# Patient Record
(demographics unavailable — no encounter records)

---

## 2024-10-11 NOTE — HISTORY OF PRESENT ILLNESS
[FreeTextEntry1] : 48 yo woman returns for f/u of obesity, pre-dm, PCOS  s/p LAGB x 2 but esophageal issues and had removed.  2-3 years ago had VSG.   max weight was 292 lbs  current weight = 202  lbs (6 lb weight loss since last visit) height = 5'3  recently started tamoxifen doing well with zepbound 15, phentermine 37.5 and topiramate 50 feels eating under control continues losing weight  she is otherwise without new complaints today

## 2024-10-11 NOTE — ASSESSMENT
[FreeTextEntry1] : BARIATRIC SURGERY HISTORY: LAGB x 2 max weight had been 265, lost 65 lbs. regained weight after second band deflated up to 292 lbs. VSG in 2019 with drop in weight to 237, regained to 270 - now down to 232 lbs  OBESITY COMORBIDITIES: pre-dm  ANTI-OBESITY MEDICATIONS: semaglutide  OBESITY MEDICATION SIDE EFFECTS: none   recommend the followin. cont zepbound 15mg 2. cont phentermine 37.5mg/topiramate 50 3. would benefit from increased physical activity longterm 4. bariatric vitamin recommended 5. discussed migrating towards WFPB diet due to hormonal based cancer 6. cont to work with NP on nutritional lifestyle changes  rtc in 6 weeks    rtc in 4-6 weeks.

## 2024-10-23 NOTE — PROCEDURE
[IUD Removal] : intrauterine device (IUD) removal [Time out performed] : Pre-procedure time out performed.  Patient's name, date of birth and procedure confirmed. [Consent Obtained] : Consent obtained [Risks] : risks [Benefits] : benefits [Alternatives] : alternatives [Patient] : patient [Speculum Placed] : speculum placed [Nonvisualization of Strings] : nonvisualization of strings [IUD Discarded] : IUD discarded [Tolerated Well] : Patient tolerated the procedure well [No Complications] : no complications [de-identified] : DCIS [de-identified] : IUD POSITION CONFIRMED W SONO;  IUD REMOVED W POLYP FORCEP

## 2025-02-05 NOTE — PLAN
[FreeTextEntry1] : ADVISED TO AVOID SEXUAL ACTIVITY UNTIL FUNDAL PLACEMENT CAN BE CONFIRMED BY RADIOLOGY.

## 2025-02-05 NOTE — PROCEDURE
[IUD Placement] : intrauterine device (IUD) placement [Prevention of Pregnancy] : prevention of pregnancy [Risks] : risks [Benefits] : benefits [Alternatives] : alternatives [Patient] : patient [Infection] : infection [Bleeding] : bleeding [Pain] : pain [Expulsion] : expulsion [Failure] : failure [Neg Pregnancy Test] : negative pregnancy test [History of Unprotected [place]] : no history of unprotected intercourse [No Premedication] : No premedication [Betadine] : Betadine [Tenaculum] : Tenaculum [Easy Passage] : Easy passage [Sounded to ___ cm] : sounded to [unfilled] ~Ucm [ParaGard IUD] : ParaGard IUD [Tolerated Well] : Patient tolerated the procedure well [No Complications] : No complications [None] : None [de-identified] : PARACERVICAL BLOCK PLACED W 10CC LIDOCAINE [de-identified] : TVS PERFORMED POST INSERTION;  IUD VISUALIZED IN ENDOMETRIAL CAVITY.  UNCLEAR IF IUD PRESENT TO FUNDUS OR OBSTRUCTED BY FIBROID [de-identified] : [id number] [de-identified] : 7/2029 [de-identified] : 12YRS [TextEntry] : MIRENA WAS REMOVED IN OCT DUE TO DCIS.  SINCE IUD REMOVAL MENSES HAVE BEEN HEAVY W BLEEDING FOR UP TO 10DAYS.  EXPLAINED THAT PARAGARD WILL MAKE BLEEDING WORSE.  DISCUSSED MGT OPTIONS OF ABLATION, [place], HYST.  PT WILL CONSIDER OPTIONS BUT DESIRES PARAGARD TODAY TO PROVIDE CONTRACEPTION WHILE SHE DECIDES ON LONG TERM MGT

## 2025-02-05 NOTE — REASON FOR VISIT
[Follow-Up Visit] : a follow-up visit for [Obesity] : obesity [Home] : at home, [unfilled] , at the time of the visit. [Other Location: e.g. Home (Enter Location, City,State)___] : at [unfilled] [Other:____] : [unfilled] [Verbal consent obtained from patient] : the patient, [unfilled]

## 2025-02-05 NOTE — HISTORY OF PRESENT ILLNESS
[FreeTextEntry1] : 50 yo woman returns for f/u of obesity, pre-dm, PCOS  s/p LAGB x 2 but esophageal issues and had removed.  2-3 years ago had VSG.   max weight was 292 lbs  current weight = 189  lbs (13 lb weight loss since last visit) height = 5'3  on tamoxifen doing well with zepbound 15, phentermine 37.5 and topiramate 50 feels eating under control continues losing weight maybe not enough water a little less exercise than before because of work stress  she is otherwise without new complaints today

## 2025-02-10 NOTE — PROCEDURE
[TextEntry] : HcG: negative  The patient was counseled on the risks, benefits and alternatives to the IUD.  The patient desires removal of the IUD due to malposition.    They desire Copper IUD for contraception.  Pre-operative time out performed.  Patients name, date of birth and procedure confirmed.  Speculum placed, IUD strings visualized. Paracervical block placed in standard fashion using 10 cc 1% lidocaine, IUD strings grasped with ring forceps  and removed without incident.  Excellent hemostasis noted. The patient tolerated the procedure well. EBL: minimal  Hcg: negative The patient denies unprotected intercourse in the last 7 days  The patient was counseled on the risks, benefits and alternatives to the Copper IUD.  The  patient understand the risks of infection, bleeding and trauma (specifically to the vagina, cervix, uterus, ovaries, fallopian tubes, bowel, bladder, ureters, rectum, pelvic nerves and blood vessels) and the small chance of IUD embedment, incorrect placement, incomplete removal and uterine perforation.  They were counseled on the small chance of uterine perforation, embedment and incorrect placement, incomplete removal, and that laparoscopy or hysteroscopy might be necessary to remove the IUD, and may or may not be successful in IUD removal. The patient was counseled on potential side effects including changes in bleeding, specifically irregular bleeding in the first 3-6 months and slightly heavier, crampier menses.  They were counseled on the increased risk of infection in the 3 weeks following insertion, the risk of pregnancy, and of ectopic pregnancy (and the need to see a physician immediately if she experiences signs/symptoms of pregnancy), and the risk of expulsion.  They understand the need to follow up for a string check.  They were informed that IUDs do not protect against sexually transmitted diseases and encouraged to use condoms.  All questions were answered.    Pelvic Exam: Uterus: anteverted  Pre-operative time out performed.  Patients name, date of birth and procedure confirmed.  Speculum placed, cervix cleansed x3 with betadyne.  An Anushka tenaculum was placed on the anterior lip of the cervix after infiltration with 10cc of 1% lidocaine.  A Copper IUD was brought onto the sterile field and inserted to the uterine fundus under direct ultrasound guidance per protocol in the usual fashion. Placement confirmed on ultrasound in the sagittal and transverse positions. Strings were trimmed to 2 cm.   Excellent hemostasis was noted. The patient tolerated the procedure well. EBL: minimal  Lot#: [id number] Expiration: 01/2030

## 2025-03-13 NOTE — RESULTS/DATA
[TextEntry] : Pelvic u/s2/6/2025   FINDINGS: Uterus: 11.2 cm x 5.9 cm x 6.7 cm. Myomatous change with multiple scattered myomas. Anterior lower uterine segment intramural 3.7 x 3.3 cm myoma. 1.5 x 1.3 cm anterior myoma. Endometrium: The IUD is low in position and is within the cervix and may be entering the lower uterine segment. The remainder the endometrium is heterogeneous in a patient actively bleeding likely blood degradation products.  Right ovary: 1.4 cm x 2.8 cm x 1.6 cm. Within normal limits. Left ovary: 0.9 cm x 3.0 cm x 0.9 cm. Within normal limits.  Fluid: None.  IMPRESSION: IUD identified within the cervix  Multiple myomatous.  The endometrium is distended with heterogeneous material consistent with the patient actively bleeding

## 2025-03-13 NOTE — HISTORY OF PRESENT ILLNESS
[FreeTextEntry1] : Patient is a 51 yo here for consultation due to heavy menstrual bleeding. Patient recently had Mirena IUD removed in the fall after which point she presented and requested a Paragard IUD for contraception. Since Mirena removal menses have been heavy with bleeding up to 10 days. Patient represented 5 days later due to it being malpositioned and had it removed and replaced with a different Paragard IUD. She had a pelvic u/s 2/6 noting 11 cm uterus with multiple myomas, atnerior SHEN IM 4 cm, 2 cm anterior myoma. IUD is in low position and within cervix/SHEN, endometrium is heterogeneous and bleeding, b/l ovaries normal. She has a hx of heavy menstrual bleeding in the past. With Mirena cycles were light and very 2 months.   Patient has a hx of DCIS on path s/p recent breast reduction. She is seeing breast surgeons and hem/onc at [facility] and was recommended to start Tamoxifen in the fall. She was recommended to have Mirena removed due to above path. She continues on Tamoxifen.   Patient is currently on GLP for weight loss zepbound 15, phentermine and topamax.   Ob Hx  Gyn Hx +PCOS,  [Patient reported PAP Smear was normal] : Patient reported PAP Smear was normal [Mammogramdate] : 05/24 [TextBox_19] : dense breasts birads 2 [BreastSonogramDate] : 05/24 [TextBox_25] : L breasts with multiple cysts up to 3.9 cm [PapSmeardate] : 05/23

## 2025-05-15 NOTE — REVIEW OF SYSTEMS
[Fatigue] : fatigue [Arthralgias] : arthralgias [Depression] : depression [Sleep Disturbances] : sleep disturbances [Feeling Weak] : feeling weak [Negative] : Heme/Lymph

## 2025-05-15 NOTE — HISTORY OF PRESENT ILLNESS
[Patient reported mammogram was normal] : Patient reported mammogram was normal [Patient reported breast sonogram was abnormal] : Patient reported breast sonogram was abnormal [Patient reported PAP Smear was normal] : Patient reported PAP Smear was normal [Regular Cycle Intervals] : periods have been regular [Frequency: Q ___ days] : menstrual periods occur approximately every [unfilled] days [Menarche Age: ____] : age at menarche was [unfilled] [Currently Active] : currently active [Yes] : Yes [Mammogramdate] : 05/24 [TextBox_19] : dense breasts birads 2 [BreastSonogramDate] : 05/24 [TextBox_25] : L breasts with multiple cysts up to 3.9 cm [PapSmeardate] : 05/23 [FreeTextEntry1] : 03/10/25 [FreeTextEntry3] : Paragard IUD

## 2025-05-19 NOTE — REASON FOR VISIT
[Annual] : an annual visit. [TextEntry] : ANNUAL EXAM.  MENSES HEAVY W PARAGARD.  CONSIDERING ABLATION-  SEEN BY DR GARDNER LAST WK yes

## 2025-05-19 NOTE — HISTORY OF PRESENT ILLNESS
[Mammogramdate] : 5/25 [TextBox_19] : TO DR MILLS;  RESULTS REVIEWED ON PTS PHONE [PapSmeardate] : 2023 [ColonoscopyDate] : 2022

## 2025-05-19 NOTE — PHYSICAL EXAM
[Appropriately responsive] : appropriately responsive [Alert] : alert [No Acute Distress] : no acute distress [No Lymphadenopathy] : no lymphadenopathy [Soft] : soft [Non-tender] : non-tender [Non-distended] : non-distended [No HSM] : No HSM [No Lesions] : no lesions [No Mass] : no mass [Oriented x3] : oriented x3 [Examination Of The Breasts] : a normal appearance [No Masses] : no breast masses were palpable [Labia Majora] : normal [Labia Minora] : normal [Normal] : normal [Enlarged ___ wks] : enlarged [unfilled] ~Uweeks [Uterine Adnexae] : normal [FreeTextEntry5] : IUD STRING NOT VISIBLE [TextEntry] : S/P BREAST REDUCTION Rituxan Counseling:  I discussed with the patient the risks of Rituxan infusions. Side effects can include infusion reactions, severe drug rashes including mucocutaneous reactions, reactivation of latent hepatitis and other infections and rarely progressive multifocal leukoencephalopathy.  All of the patient's questions and concerns were addressed.

## 2025-07-17 NOTE — HISTORY OF PRESENT ILLNESS
[FreeTextEntry1] : 51 yo woman returns for f/u of obesity, pre-dm, PCOS  s/p LAGB x 2 but esophageal issues and had removed.  2-3 years ago had VSG.   max weight was 292 lbs  current weight = weight  189  lbs (weight unchanged though had reached low of 85 lbs) height = 5'3   doing well with zepbound 15, phentermine 37.5 and topiramate 50  she is otherwise without new complaints today

## 2025-07-17 NOTE — ASSESSMENT
[FreeTextEntry1] : BARIATRIC SURGERY HISTORY: LAGB x 2 max weight had been 265, lost 65 lbs. regained weight after second band deflated up to 292 lbs. VSG in 2019 with drop in weight to 237, regained to 270 - now down to 189 lbs  OBESITY COMORBIDITIES: pre-dm  ANTI-OBESITY MEDICATIONS: semaglutide  OBESITY MEDICATION SIDE EFFECTS: none   recommend the following:  will be having uterine surgery on 7/25 last dose of zepbound was 7/10 and will hold until after surgery and will restart at 10mg for the first month (minimum 2 days post-op and as long as not on opiates) and then increase back to 15 mg for second month will be holding phentermine and topiramate as of 7/18 (1 week in advance of surgery).  can restart a few days after surgery con bariatric vitamin adequate hydration and protein intake  rtc in 6 weeks